# Patient Record
Sex: FEMALE | Race: OTHER | NOT HISPANIC OR LATINO | URBAN - METROPOLITAN AREA
[De-identification: names, ages, dates, MRNs, and addresses within clinical notes are randomized per-mention and may not be internally consistent; named-entity substitution may affect disease eponyms.]

---

## 2018-04-19 ENCOUNTER — EMERGENCY (EMERGENCY)
Facility: HOSPITAL | Age: 48
LOS: 1 days | Discharge: HOME | End: 2018-04-19
Attending: EMERGENCY MEDICINE | Admitting: EMERGENCY MEDICINE
Payer: COMMERCIAL

## 2018-04-19 VITALS
OXYGEN SATURATION: 97 % | SYSTOLIC BLOOD PRESSURE: 134 MMHG | TEMPERATURE: 98 F | HEART RATE: 84 BPM | RESPIRATION RATE: 16 BRPM | DIASTOLIC BLOOD PRESSURE: 75 MMHG

## 2018-04-19 VITALS
TEMPERATURE: 99 F | WEIGHT: 104.94 LBS | DIASTOLIC BLOOD PRESSURE: 69 MMHG | OXYGEN SATURATION: 98 % | HEART RATE: 97 BPM | RESPIRATION RATE: 16 BRPM | HEIGHT: 62 IN | SYSTOLIC BLOOD PRESSURE: 136 MMHG

## 2018-04-19 LAB
ANION GAP SERPL CALC-SCNC: 7 MMOL/L — SIGNIFICANT CHANGE UP (ref 5–17)
BASOPHILS # BLD AUTO: 0.1 K/UL — SIGNIFICANT CHANGE UP (ref 0–0.2)
BASOPHILS NFR BLD AUTO: 1.2 % — SIGNIFICANT CHANGE UP (ref 0–2)
BUN SERPL-MCNC: 12 MG/DL — SIGNIFICANT CHANGE UP (ref 7–23)
CALCIUM SERPL-MCNC: 8.9 MG/DL — SIGNIFICANT CHANGE UP (ref 8.4–10.5)
CHLORIDE SERPL-SCNC: 106 MMOL/L — SIGNIFICANT CHANGE UP (ref 96–108)
CO2 SERPL-SCNC: 27 MMOL/L — SIGNIFICANT CHANGE UP (ref 22–31)
CREAT SERPL-MCNC: 0.65 MG/DL — SIGNIFICANT CHANGE UP (ref 0.5–1.3)
D DIMER BLD IA.RAPID-MCNC: <150 NG/ML DDU — SIGNIFICANT CHANGE UP
EOSINOPHIL # BLD AUTO: 0 K/UL — SIGNIFICANT CHANGE UP (ref 0–0.5)
EOSINOPHIL NFR BLD AUTO: 1.2 % — SIGNIFICANT CHANGE UP (ref 0–6)
GLUCOSE SERPL-MCNC: 149 MG/DL — HIGH (ref 70–99)
HCT VFR BLD CALC: 35.4 % — SIGNIFICANT CHANGE UP (ref 34.5–45)
HGB BLD-MCNC: 12.1 G/DL — SIGNIFICANT CHANGE UP (ref 11.5–15.5)
LYMPHOCYTES # BLD AUTO: 1.7 K/UL — SIGNIFICANT CHANGE UP (ref 1–3.3)
LYMPHOCYTES # BLD AUTO: 40.4 % — SIGNIFICANT CHANGE UP (ref 13–44)
MCHC RBC-ENTMCNC: 29.4 PG — SIGNIFICANT CHANGE UP (ref 27–34)
MCHC RBC-ENTMCNC: 34.3 GM/DL — SIGNIFICANT CHANGE UP (ref 32–36)
MCV RBC AUTO: 85.6 FL — SIGNIFICANT CHANGE UP (ref 80–100)
MONOCYTES # BLD AUTO: 0.1 K/UL — SIGNIFICANT CHANGE UP (ref 0–0.9)
MONOCYTES NFR BLD AUTO: 3.3 % — SIGNIFICANT CHANGE UP (ref 2–14)
NEUTROPHILS # BLD AUTO: 2.3 K/UL — SIGNIFICANT CHANGE UP (ref 1.8–7.4)
NEUTROPHILS NFR BLD AUTO: 53.9 % — SIGNIFICANT CHANGE UP (ref 43–77)
PLATELET # BLD AUTO: 218 K/UL — SIGNIFICANT CHANGE UP (ref 150–400)
POTASSIUM SERPL-MCNC: 3.4 MMOL/L — LOW (ref 3.5–5.3)
POTASSIUM SERPL-SCNC: 3.4 MMOL/L — LOW (ref 3.5–5.3)
RBC # BLD: 4.13 M/UL — SIGNIFICANT CHANGE UP (ref 3.8–5.2)
RBC # FLD: 11.4 % — SIGNIFICANT CHANGE UP (ref 10.3–14.5)
SODIUM SERPL-SCNC: 140 MMOL/L — SIGNIFICANT CHANGE UP (ref 135–145)
WBC # BLD: 4.2 K/UL — SIGNIFICANT CHANGE UP (ref 3.8–10.5)
WBC # FLD AUTO: 4.2 K/UL — SIGNIFICANT CHANGE UP (ref 3.8–10.5)

## 2018-04-19 NOTE — ED PROVIDER NOTE - MEDICAL DECISION MAKING DETAILS
Camille: etiology of "dyspnea" unclear, may be seasonal allergies, will r/o PE w d-dimer and pt will follow up w pulmonologist tomorrow as scheduled.

## 2018-04-19 NOTE — ED PROVIDER NOTE - OBJECTIVE STATEMENT
49 y/o F pt w/ PMHx chronic bronchitis, asthma presents to the ED c/o SOB with associated back pain x 2-3 weeks. Pt states if she takes ibuprofen or Nyquil her symptoms resolve quickly, states allergy medication does not improve her symptoms. Reports she saw her PMD earlier this week, chest XR completed 4 days ago which was normal. Pt states she has been making herself cough because she feels like she has chest congestion but denies bringing up any phlegm. Pt states she has a scheduled appointment with her pulmonologist tomorrow. Pt denies fever, CP, recent travel or any other complaints at this time.     PMD: Dr. Emmanuel 47 y/o F pt w/ PMHx chronic bronchitis, asthma presents to the ED c/o SOB with associated back pain x 2-3 weeks. Pt states if she takes ibuprofen or Nyquil her symptoms resolve quickly, states allergy medication does not improve her symptoms. Reports she saw her PMD earlier this week, chest XR completed 4 days ago which was normal. Pt states she has been making herself cough because she feels like she has chest congestion but denies bringing up any phlegm. Pt states she has a scheduled appointment with her pulmonologist tomorrow. Pt denies fever, CP, recent travel or any other complaints at this time.     PMD: Dr. Johnson

## 2018-04-19 NOTE — ED ADULT NURSE NOTE - NSSISCREENINGQ2_ED_A_ED
No Alert and oriented to person, place, time/situation. normal mood and affect. no apparent risk to self or others.

## 2018-12-10 NOTE — ED ADULT NURSE NOTE - ATTEMPT TO OOB
Phoned patient regarding lab results, informed patient of normal WBC and CBC, no further testing needed. Verbalized understanding and thanked writer for calling.         no

## 2022-04-19 ENCOUNTER — EMERGENCY (EMERGENCY)
Facility: HOSPITAL | Age: 52
LOS: 1 days | Discharge: HOME | End: 2022-04-19
Attending: INTERNAL MEDICINE | Admitting: EMERGENCY MEDICINE
Payer: COMMERCIAL

## 2022-04-19 VITALS
SYSTOLIC BLOOD PRESSURE: 142 MMHG | HEIGHT: 62 IN | HEART RATE: 97 BPM | OXYGEN SATURATION: 97 % | DIASTOLIC BLOOD PRESSURE: 93 MMHG | RESPIRATION RATE: 18 BRPM | WEIGHT: 113.1 LBS | TEMPERATURE: 99 F

## 2022-04-19 RX ORDER — TETANUS TOXOID, REDUCED DIPHTHERIA TOXOID AND ACELLULAR PERTUSSIS VACCINE, ADSORBED 5; 2.5; 8; 8; 2.5 [IU]/.5ML; [IU]/.5ML; UG/.5ML; UG/.5ML; UG/.5ML
0.5 SUSPENSION INTRAMUSCULAR ONCE
Refills: 0 | Status: COMPLETED | OUTPATIENT
Start: 2022-04-19 | End: 2022-04-19

## 2022-04-19 RX ORDER — RABIES VACC, HUMAN DIPLOID/PF 2.5 UNIT
1 VIAL (EA) INTRAMUSCULAR ONCE
Refills: 0 | Status: COMPLETED | OUTPATIENT
Start: 2022-04-19 | End: 2022-04-19

## 2022-04-19 RX ORDER — HUMAN RABIES VIRUS IMMUNE GLOBULIN 150 [IU]/ML
1050 INJECTION, SOLUTION INTRAMUSCULAR ONCE
Refills: 0 | Status: COMPLETED | OUTPATIENT
Start: 2022-04-19 | End: 2022-04-19

## 2022-04-19 RX ORDER — ACETAMINOPHEN 500 MG
650 TABLET ORAL ONCE
Refills: 0 | Status: COMPLETED | OUTPATIENT
Start: 2022-04-19 | End: 2022-04-19

## 2022-04-19 RX ADMIN — Medication 1 MILLILITER(S): at 14:10

## 2022-04-19 RX ADMIN — Medication 650 MILLIGRAM(S): at 13:29

## 2022-04-19 RX ADMIN — HUMAN RABIES VIRUS IMMUNE GLOBULIN 1050 UNIT(S): 150 INJECTION, SOLUTION INTRAMUSCULAR at 14:12

## 2022-04-19 RX ADMIN — TETANUS TOXOID, REDUCED DIPHTHERIA TOXOID AND ACELLULAR PERTUSSIS VACCINE, ADSORBED 0.5 MILLILITER(S): 5; 2.5; 8; 8; 2.5 SUSPENSION INTRAMUSCULAR at 13:29

## 2022-04-19 RX ADMIN — Medication 1 TABLET(S): at 13:29

## 2022-04-19 NOTE — ED PROVIDER NOTE - IV ALTEPLASE EXCL ABS HIDDEN
- Closed proximal right clavicle fracture  - Await Orthopedic surgery evaluation and recommendations  - Maintain nonweightbearing status on the right upper extremity and sling as ordered for immobilization/support/comfort  - Initiate multimodal analgesic regimen  - Monitor neurovascular exam   - PT and OT evaluation and treatment as indicated when appropriate  - Initiate DVT prophylaxis  show

## 2022-04-19 NOTE — ED PROVIDER NOTE - PHYSICAL EXAMINATION
Patient's HR consistently in 150's, Pt asymptomatic, contacted Dr. Hammer who was made aware. MD instructed writer to give 2100 dose of sotolol as scheduled and rate should resolve with no additional orders. Will continue to monitor.    skin: +multiple puncture wounds of the left arm with ecchymosis. no active bleeding or lacerations noted

## 2022-04-19 NOTE — ED PROVIDER NOTE - PATIENT PORTAL LINK FT
You can access the FollowMyHealth Patient Portal offered by [facility] by registering at the following website: [email or web address]. By joining [employer]’s FollowMyHealth portal, you will also be able to view your health information using other applications (apps) compatible with our system.

## 2022-04-19 NOTE — ED PROVIDER NOTE - ATTENDING APP SHARED VISIT CONTRIBUTION OF CARE
51 y/o F with no reported PMH presents to the ED with c/o multiple dog bites from her [place] oquendo dog today. Pt report she was walking the dog and pulled the leash, the dog turned around and attacked her. Animal control took the dog away. Pt reports the dog is aggressive, similar episode occurred 2 yrs ago. The dog is behind on vaccinations and was not vaccinated for rabies.  Pt is not UTD on tdap. PE as noted above. Rabies and tdap vaccine given in the ED. Augmentin started. will vianney Curtis:  I have reviewed and discussed with the PA/ resident the case specifics, including the history, physical assessment, evaluation, conclusion, laboratory results, and medical plan. I agree with the contents, and conclusions. I have personally examined, and interviewed the patient.

## 2022-04-19 NOTE — ED PROVIDER NOTE - NS ED ATTENDING STATEMENT MOD
This was a shared visit with the OLGA. I reviewed and verified the documentation and independently performed the documented:

## 2022-04-19 NOTE — ED ADULT TRIAGE NOTE - CHIEF COMPLAINT QUOTE
Pt stated she was attacked by her dog, c/o multiple abrasions/scratches on left shoulder/arm, dog not UTD with rabies shot

## 2022-04-19 NOTE — ED PROVIDER NOTE - NSFOLLOWUPINSTRUCTIONS_ED_ALL_ED_FT
RETURN TO THE ER FOR RABIES VACCINE ON 4/22/22, 4/26/22 AND 5/3/22    Please fill the prescription for the antibiotics and take as directed.  Please finish the entire course of medication as prescribed.  If you have any belly pain after the antibiotics, yogurt has been shown to help with this.  Do not use any alcohol or grapefruit juice with any antibiotics.    Stay hydrated    Return to the ER if your symptoms worsen or for any other medical emergencies  ****************    Animal Bite    Animal bites can range from mild to serious. An animal bite can result in a scratch on the skin, a deep open cut, a puncture of the skin, a crush injury, or tearing away of the skin or a body part. Treatment includes wound care, updating your tetanus shot, and in some cases, administering a rabies vaccine. If you were prescribed an antibiotic, take it as told by your health care provider. Do not stop using the antibiotic even if your condition improves.      SEEK IMMEDIATE MEDICAL CARE IF YOU HAVE ANY OF THE FOLLOWING SYMPTOMS: red streaking away from the wound, fluid/blood/pus coming from the wound, fever or chills, trouble moving the injured area, numbness or tingling extending beyond the wound.

## 2022-04-19 NOTE — ED ADULT NURSE NOTE - OBJECTIVE STATEMENT
Pt presents to ER with chief complaint of dog bite from Moroc[place] oquendo, pt is the dog owner . Pt states her dog is aggressive and had an issue with dog 2 years ago, pt dog is 2 years behind on rabies vaccine. No signs of infection noted, no bleeding, drainage or swelling noted. No s/s of distress noted, will continue to monitor, patient safety maintained. Bed placed in lowest position, non slip socks applied, call bell and bedside table within reach.

## 2022-04-19 NOTE — ED PROVIDER NOTE - OBJECTIVE STATEMENT
51 y/o F with no reported PMH presents to the ED with c/o multiple dog bites from her Macedonian oquendo dog today. Pt report she was walking the dog and pulled the leash, the dog turned around and attacked her. Animal control took the dog away. Pt reports the dog is aggressive, similar episode occurred 2 yrs ago. The dog is behind on vaccinations and was not vaccinated for rabies.  Pt is not UTD on tdap.

## 2022-04-19 NOTE — ED PROVIDER NOTE - CLINICAL SUMMARY MEDICAL DECISION MAKING FREE TEXT BOX
51 y/o F with no reported PMH presents to the ED with c/o multiple dog bites from her Japanese oquendo dog today. Pt report she was walking the dog and pulled the leash, the dog turned around and attacked her. Animal control took the dog away. Pt reports the dog is aggressive, similar episode occurred 2 yrs ago. The dog is behind on vaccinations and was not vaccinated for rabies.  Pt is not UTD on tdap. PE as noted above. Rabies and tdap vaccine given in the ED. Augmentin started, reassess 51 y/o F with no reported PMH presents to the ED with c/o multiple dog bites from her Liberian oquendo dog today. Pt report she was walking the dog and pulled the leash, the dog turned around and attacked her. Animal control took the dog away. Pt reports the dog is aggressive, similar episode occurred 2 yrs ago. The dog is behind on vaccinations and was not vaccinated for rabies.  Pt is not UTD on tdap. PE as noted above. Rabies and tdap vaccine given in the ED. Augmentin started. will dc

## 2022-04-27 ENCOUNTER — EMERGENCY (EMERGENCY)
Facility: HOSPITAL | Age: 52
LOS: 1 days | Discharge: HOME | End: 2022-04-27
Attending: EMERGENCY MEDICINE | Admitting: EMERGENCY MEDICINE
Payer: COMMERCIAL

## 2022-04-27 VITALS
HEART RATE: 80 BPM | SYSTOLIC BLOOD PRESSURE: 150 MMHG | RESPIRATION RATE: 16 BRPM | TEMPERATURE: 98 F | DIASTOLIC BLOOD PRESSURE: 94 MMHG | OXYGEN SATURATION: 98 % | WEIGHT: 110.01 LBS | HEIGHT: 62 IN

## 2022-04-27 RX ORDER — RABIES VACC, HUMAN DIPLOID/PF 2.5 UNIT
1 VIAL (EA) INTRAMUSCULAR ONCE
Refills: 0 | Status: DISCONTINUED | OUTPATIENT
Start: 2022-04-27 | End: 2022-04-27

## 2022-04-27 RX ORDER — RABIES VACC, HUMAN DIPLOID/PF 2.5 UNIT
1 VIAL (EA) INTRAMUSCULAR ONCE
Refills: 0 | Status: COMPLETED | OUTPATIENT
Start: 2022-04-27 | End: 2022-04-27

## 2022-04-27 RX ADMIN — Medication 1 MILLILITER(S): at 16:44

## 2022-04-27 NOTE — ED PROVIDER NOTE - ATTENDING APP SHARED VISIT CONTRIBUTION OF CARE
Rony with [facility] Corder[facility]. 51y/o F presents to the ED for her rabies vaccine. Pt was seen in the ED 8 days ago when she received rabies immunoglobulin and rabies vaccine for a dog bite. pt missed her day 3 and day 7 rabies vaccine dose because she was in [place]. Pt reports compliance with her abx. She denies f/c, drainage from the wounds or signs of infection. PE as noted above. BAL called, no answer.  Per our clinical guidelines pt can resume the rabies schedule from Day 3. will give day 3 vaccine in the ED now and instruct to come back on day 7 and day 14    I performed a face to face bedside interview with patient regarding history of present illness, review of symptoms and past medical history. I completed an independent physical exam.  I have discussed the patient's plan of care with Physician Assistant (PA). I agree with note as stated above, having amended the EMR as needed to reflect my findings.   This includes History of Present Illness, HIV, Past Medical/Surgical/Family/Social History, Allergies and Home Medications, Review of Systems, Physical Exam, and any Progress Notes during the time I functioned as the attending physician for this patient.

## 2022-04-27 NOTE — ED PROVIDER NOTE - OBJECTIVE STATEMENT
51y/o F presents to the ED for her rabies vaccine. Pt was seen in the ED 8 days ago when she received rabies immunoglobulin and rabies vaccine for a dog bite. pt missed her day 3 and day 7 rabies vaccine dose because she was in [place]. Pt reports compliance with her abx. She denies f/c, drainage from the wounds or signs of infection.

## 2022-04-27 NOTE — ED PROVIDER NOTE - PATIENT PORTAL LINK FT
You can access the FollowMyHealth Patient Portal offered by [facility] by registering at the following website: [email or web address]. By joining [employer]’s FollowMyHealth portal, you will also be able to view your health information using other applications (apps) compatible with our system.

## 2022-04-27 NOTE — ED PROVIDER NOTE - CLINICAL SUMMARY MEDICAL DECISION MAKING FREE TEXT BOX
53y/o F presents to the ED for her rabies vaccine. Pt was seen in the ED 8 days ago when she received rabies immunoglobulin and rabies vaccine for a dog bite. pt missed her day 3 and day 7 rabies vaccine dose because she was in [place]. Pt reports compliance with her abx. She denies f/c, drainage from the wounds or signs of infection. PE as noted above. BAL called, no answer.  Per our clinical guidelines pt can resume the rabies schedule from Day 3. will give day 3 vaccine in the ED now and instruct to come back on day 7 and day 14

## 2022-04-27 NOTE — ED PROVIDER NOTE - NSFOLLOWUPINSTRUCTIONS_ED_ALL_ED_FT
RETURN TO THE ER FOR RABIES VACCINE ON  5/1/22 and 5/8/22    Stay hydrated    Return to the ER if your symptoms worsen or for any other medical emergencies

## 2022-04-28 PROBLEM — Z78.9 OTHER SPECIFIED HEALTH STATUS: Chronic | Status: ACTIVE | Noted: 2022-04-19

## 2023-09-10 ENCOUNTER — EMERGENCY (EMERGENCY)
Facility: HOSPITAL | Age: 53
LOS: 1 days | Discharge: HOME | End: 2023-09-10
Attending: EMERGENCY MEDICINE | Admitting: EMERGENCY MEDICINE
Payer: COMMERCIAL

## 2023-09-10 VITALS
RESPIRATION RATE: 20 BRPM | TEMPERATURE: 98 F | WEIGHT: 104.94 LBS | DIASTOLIC BLOOD PRESSURE: 79 MMHG | HEART RATE: 92 BPM | HEIGHT: 62 IN | SYSTOLIC BLOOD PRESSURE: 122 MMHG | OXYGEN SATURATION: 95 %

## 2023-09-10 RX ORDER — SODIUM CHLORIDE 9 MG/ML
1000 INJECTION INTRAMUSCULAR; INTRAVENOUS; SUBCUTANEOUS ONCE
Refills: 0 | Status: COMPLETED | OUTPATIENT
Start: 2023-09-10 | End: 2023-09-10

## 2023-09-10 RX ORDER — KETOROLAC TROMETHAMINE 30 MG/ML
15 SYRINGE (ML) INJECTION ONCE
Refills: 0 | Status: DISCONTINUED | OUTPATIENT
Start: 2023-09-10 | End: 2023-09-10

## 2023-09-10 RX ADMIN — SODIUM CHLORIDE 1000 MILLILITER(S): 9 INJECTION INTRAMUSCULAR; INTRAVENOUS; SUBCUTANEOUS at 14:54

## 2023-09-10 RX ADMIN — Medication 15 MILLIGRAM(S): at 14:54

## 2023-09-10 NOTE — ED PROVIDER NOTE - NSFOLLOWUPINSTRUCTIONS_ED_ALL_ED_FT
Take tylenol 650 mg every 4- 6 hours as needed for pain    Take motrin 600mg every 6 hours as needed for pain   Drink plenty of fluids   Return to the ED if any worsening or persistent symptoms.     COVID-19 (Coronavirus Disease 2019)    WHAT YOU NEED TO KNOW:    COVID-19 is the disease caused by a coronavirus first discovered in December 2019. The virus can be spread starting 2 to 3 days before symptoms begin. The virus has changed into several new forms (called variants) since it was discovered. A variant may be more easily spread or cause more severe illness than the original form.    DISCHARGE INSTRUCTIONS:    Call your local emergency number (911 in the ) if:    You have trouble breathing or shortness of breath at rest.    You have chest pain or pressure that lasts longer than 5 minutes.    You become confused or hard to wake.  Seek care immediately if:    The skin on your face, fingers, or toes look blue or darker than usual.    Call your doctor if:    You have a fever.    You have questions or concerns about your condition or care.  Medicines: You may need any of the following:    Decongestants help reduce nasal congestion and help you breathe more easily. If you take decongestant pills, they may make you feel restless or cause problems with your sleep. Do not use decongestant sprays for more than a few days.    Cough suppressants help reduce coughing. Ask your healthcare provider which type of cough medicine is best for you.    Acetaminophen decreases pain and fever. It is available without a doctor's order. Ask how much to take and how often to take it. Follow directions. Read the labels of all other medicines you are using to see if they also contain acetaminophen, or ask your doctor or pharmacist. Acetaminophen can cause liver damage if not taken correctly.    NSAIDs, such as ibuprofen, help decrease swelling, pain, and fever. This medicine is available with or without a doctor's order. NSAIDs can cause stomach bleeding or kidney problems in certain people. If you take blood thinner medicine, always ask your healthcare provider if NSAIDs are safe for you. Always read the medicine label and follow directions.    Blood thinners help prevent blood clots. Clots can cause strokes, heart attacks, and death. Many types of blood thinners are available. Your healthcare provider will give you specific instructions for the type you are given. The following are general safety guidelines to follow while you are taking a blood thinner:  Watch for bleeding and bruising. Watch for bleeding from your gums or nose. Watch for blood in your urine and bowel movements. Use a soft washcloth on your skin, and a soft toothbrush to brush your teeth. This can keep your skin and gums from bleeding. If you shave, use an electric shaver. Do not play contact sports.    Tell your dentist and other healthcare providers that you take a blood thinner. Wear a bracelet or necklace that says you take this medicine.    Do not start or stop any other medicines or supplements unless your healthcare provider tells you to. Many medicines and supplements cannot be used with blood thinners.    Take your blood thinner exactly as prescribed by your healthcare provider. Do not skip does or take less than prescribed. Tell your provider right away if you forget to take your blood thinner, or if you take too much.    Take your medicine as directed. Contact your healthcare provider if you think your medicine is not helping or if you have side effects. Tell your provider if you are allergic to any medicine. Keep a list of the medicines, vitamins, and herbs you take. Include the amounts, and when and why you take them. Bring the list or the pill bottles to follow-up visits. Carry your medicine list with you in case of an emergency.  What you need to know about COVID-19 vaccines: Healthcare providers recommend vaccination, even if you already had COVID-19.    Get a COVID-19 vaccine as directed. Vaccination is recommended for everyone 6 months or older. COVID-19 vaccines are given as a shot in 1 to 3 doses as a primary series. This depends on the vaccine brand and the age of the person who receives it. A booster dose is recommended for everyone 5 years or older after the primary series is complete. A second booster is recommended for all adults 50 or older and for immunocompromised adolescents. The second booster is also recommended for anyone who got the 1-dose brand of vaccine for the first dose and a booster. Your provider can give you more information on boosters and help you schedule all needed doses.  Recommended COVID-19 Immunization Schedule      Continue to protect yourself and others. You can become infected even after you get the vaccine. You may also be able to pass the virus to others without knowing you are infected.    After you get the vaccine, check local, national, and international travel rules. You may need to be tested before you travel. Some countries require proof of a negative test before you travel. You may also need to quarantine after you return.    Medicine may be given to prevent infection. The medicine can be given if you are at high risk for infection and cannot get the vaccine. It can also be given if your immune system does not respond well to the vaccine.  How the 2019 coronavirus spreads: Close personal contact with an infected person increases your risk for infection. This means being within 6 feet (2 meters) of the person for at least 15 minutes over 24 hours.    The virus travels in droplets that form when a person talks, sings, coughs, or sneezes. The droplets can also float in the air for minutes or hours. Infection happens when you breathe in the droplets or get them in your eyes or nose.    Person-to-person contact can spread the virus. For example, a person with the virus on his or her hands can spread it by shaking hands with someone.    The virus can stay on objects and surfaces for hours to days. You may become infected by touching the object or surface and then touching your eyes or mouth.  Help lower your risk for COVID-19 during an active outbreak:    Stay home if you are sick or think you may have COVID-19. It is important to stay home if you are waiting for a testing appointment or for test results.    Wash your hands often throughout the day. Use soap and water. Rub your soapy hands together, lacing your fingers, for at least 20 seconds. Rinse with warm, running water. Dry your hands with a clean towel or paper towel. Use hand  that contains alcohol if soap and water are not available. Teach children how to wash their hands and use hand .  Handwashing      Cover sneezes and coughs. Turn your face away and cover your mouth and nose with a tissue. Throw the tissue away. Use the bend of your arm if a tissue is not available. Then wash your hands well with soap and water or use hand . Teach children how to cover a cough or sneeze.    Wear a face covering (mask) when needed. Use a cloth covering with at least 2 layers. You can also create layers by putting a cloth covering over a disposable non-medical mask. Cover your mouth and your nose.  How to Wear a Face Covering (Mask)      Try to keep space between you and others when you are out of the house. Avoid crowds as much as possible. Wear a face covering when you must be around a large group and cannot keep space between you and others.    Clean and disinfect high-touch surfaces and objects often. Use disinfecting wipes, or make a solution of 4 teaspoons of bleach in 1 quart (4 cups) of water.    Ask about other vaccines you may need. Get the influenza (flu) vaccine as soon as recommended each year, usually starting in September or October. Get the pneumonia vaccine if recommended. Your healthcare provider can tell you if you should also get other vaccines, and when to get them.  Prevent COVID-19 Infection  Follow up with your doctor as directed: Write down your questions so you remember to ask them during your visits.    For more information:    Centers for Disease Control and Prevention  [address] [id number]  Phone: 1-414.271.2295  Web Address: http://www.cdc.gov  © [employer] 1973, 2023    	  back to top            © [employer] 1973, 2023

## 2023-09-10 NOTE — ED PROVIDER NOTE - ATTENDING APP SHARED VISIT CONTRIBUTION OF CARE
Dr. Gross: I performed a face to face bedside interview with patient regarding history of present illness, review of symptoms and past medical history. I completed an independent physical exam.  I have discussed patient's plan of care with PA.   I agree with note as stated above, having amended the EMR as needed to reflect my findings.   This includes HISTORY OF PRESENT ILLNESS, HIV, PAST MEDICAL/SURGICAL/FAMILY/SOCIAL HISTORY, ALLERGIES AND HOME MEDICATIONS, REVIEW OF SYSTEMS, PHYSICAL EXAM, and any PROGRESS NOTES during the time I functioned as the attending physician for this patient.    dr gross: 53 yr old female with no pmhx, + covid test 2 days ago, c/o sore throat, cough, bodyaches, decrease po intake x 1 week. Pt reports SOB and chest heaviness with coughing. No cp, no sob when not coughing. No sputum production.  Denies any current fever or chills.  Pt seen at urgent care, covid positive and strep negative. Pt taking tylenol for pain and last took yesterday.    well appearing, throat erythema seen, clear lungs, normal O2 sat  IVF, cxray, toradol and strep cx ordered and will re eval   330pm: xray reviewed. pt stable for dc with supportive tx.

## 2023-09-10 NOTE — ED PROVIDER NOTE - PATIENT PORTAL LINK FT
You can access the FollowMyHealth Patient Portal offered by [facility] by registering at the following website: [email or web address]. By joining [facility]’s FollowMyHealth portal, you will also be able to view your health information using other applications (apps) compatible with our system.

## 2023-09-10 NOTE — ED ADULT NURSE NOTE - OBJECTIVE STATEMENT
Assumed pt care for a 53 yr old female alert and oriented x3 complaining of a sore throat for the last few days. Pt reports she is COVID+ and since then has not been feeling well, complaining of some shortness of breath and some pressure to her chest. Dyspnea and pressure is intermittent. Pt denies any further complaints. IV established, labs collected as per order. Awaiting further disposition.

## 2023-09-10 NOTE — ED PROVIDER NOTE - OBJECTIVE STATEMENT
53 yr old female with no pmhx, + covid test 2 days ago, c/o sore throat, cough, bodyaches, decrease po intake x 1 week. Pt reports SOB and chest heaviness with coughing. Denies any current fever or chills.  Pt seen at urgent care, covid positive and strept negative. Pt taking tylenol for pain and last took yesterday. 53 yr old female with no pmhx, + covid test 2 days ago, c/o sore throat, cough, bodyaches, decrease po intake x 1 week. Pt reports SOB and chest heaviness with coughing. No cp, no sob when not coughing. No sputum production.  Denies any current fever or chills.  Pt seen at urgent care, covid positive and strep negative. Pt taking tylenol for pain and last took yesterday.

## 2023-09-10 NOTE — ED PROVIDER NOTE - RESPIRATORY [+], MLM
· Previously intubated for respiratory distress in 2021  · Initially improved now with worsening respiratory distress requiring greater than 5 L oxygen via nasal cannula  · Repeat chest x-ray with worsening pulmonary edema discussed with cardiologist and will give diuretics  · Continue prednisone, antibiotics  · Wean oxygen as tolerated COUGH

## 2023-09-10 NOTE — ED PROVIDER NOTE - CLINICAL SUMMARY MEDICAL DECISION MAKING FREE TEXT BOX
53 yr old female with no pmhx, + covid test 2 days ago, c/o sore throat, cough, bodyaches, decrease po intake x 1 week. Pt reports SOB and chest heaviness with coughing. Denies any current fever or chills.  Pt seen at urgent care, covid positive and strept negative. Pt taking tylenol for pain and last took yesterday.    well appearing, throat erythema seen, clear lungs   IVF, cxray, toradol and strep cx ordered and will re eval 53 yr old female with no pmhx, + covid test 2 days ago, c/o sore throat, cough, bodyaches, decrease po intake x 1 week. Pt reports SOB and chest heaviness with coughing. Denies any current fever or chills.  Pt seen at urgent care, covid positive and strept negative. Pt taking tylenol for pain and last took yesterday.    well appearing, throat erythema seen, clear lungs   IVF, cxray, toradol and strep cx ordered and will re eval   330pm: xray reviewed. pt stable for dc with supportive tx. 53 yr old female with no pmhx, + covid test 2 days ago, c/o sore throat, cough, bodyaches, decrease po intake x 1 week. Pt reports SOB and chest heaviness with coughing. No cp, no sob when not coughing. No sputum production.  Denies any current fever or chills.  Pt seen at urgent care, covid positive and strep negative. Pt taking tylenol for pain and last took yesterday.    well appearing, throat erythema seen, clear lungs, normal O2 sat  IVF, cxray, toradol and strep cx ordered and will re eval   330pm: xray reviewed. pt stable for dc with supportive tx.

## 2023-09-10 NOTE — ED PROVIDER NOTE - PHYSICAL EXAMINATION
Gen:  alert, awake, no acute distress  Head:  atraumatic, normocephalic  HEENT: PERRLA, EOMI, normal nose, normal oropharynx, no tonsillar edema, erythema, or exudate  CV:  rrr, nl S1, S2, no m/r/g, no chest wall ttp  Pulm:  lungs CTA b/l  Abd: s/nt/nd, +BS  MSK:  moving all extremities, no back midline ttp, no stepoffs, no cva TTP  Neuro:  grossly intact, no focal deficits  Skin:  clear, dry, intact  Psych: AOx3, normal affect, no apparent risk to self or others

## 2023-09-11 LAB — S PYO DNA THROAT QL NAA+PROBE: SIGNIFICANT CHANGE UP

## 2023-09-11 NOTE — ED POST DISCHARGE NOTE - DETAILS
As per Dr. Gross attempted to call patient ([phone]) No answer, no voicemail. Informed pt of abnormal cxr finding, and instructed her to fu c her pmd today.   pt feels better today.

## 2024-01-24 NOTE — ED ADULT TRIAGE NOTE - ARRIVAL FROM
Labs due. Left voice message and sent [employer] message to complete lab work and schedule follow up appointment.   
Home

## 2025-01-24 NOTE — ED PROVIDER NOTE - HIV OFFER
PAST MEDICAL HISTORY:  CAD (coronary artery disease)     Chronic HFrEF (heart failure with reduced ejection fraction) 12/2023 - 30%    DM (diabetes mellitus)     ESRD on dialysis T, Thurs, Sat- permacath right chest    History of blood clotting disorder Undiagnosed- pt denies    History of BPH     History of recent fall left eye bruised    HLD (hyperlipidemia)     HTN (hypertension)     PAD (peripheral artery disease)     Prostate cancer Denies     Opt out